# Patient Record
Sex: MALE | Race: WHITE | URBAN - METROPOLITAN AREA
[De-identification: names, ages, dates, MRNs, and addresses within clinical notes are randomized per-mention and may not be internally consistent; named-entity substitution may affect disease eponyms.]

---

## 2019-06-30 ENCOUNTER — APPOINTMENT (OUTPATIENT)
Dept: GENERAL RADIOLOGY | Facility: CLINIC | Age: 39
End: 2019-06-30
Attending: FAMILY MEDICINE
Payer: COMMERCIAL

## 2019-06-30 ENCOUNTER — HOSPITAL ENCOUNTER (EMERGENCY)
Facility: CLINIC | Age: 39
Discharge: HOME | End: 2019-06-30
Attending: FAMILY MEDICINE | Admitting: FAMILY MEDICINE
Payer: COMMERCIAL

## 2019-06-30 VITALS
RESPIRATION RATE: 20 BRPM | SYSTOLIC BLOOD PRESSURE: 119 MMHG | TEMPERATURE: 98 F | OXYGEN SATURATION: 99 % | DIASTOLIC BLOOD PRESSURE: 81 MMHG

## 2019-06-30 DIAGNOSIS — T14.8XXA LOCAL INFECTION OF WOUND: ICD-10-CM

## 2019-06-30 DIAGNOSIS — L08.9 LOCAL INFECTION OF WOUND: ICD-10-CM

## 2019-06-30 DIAGNOSIS — L03.115 CELLULITIS OF RIGHT LOWER EXTREMITY: ICD-10-CM

## 2019-06-30 LAB
ANION GAP SERPL CALCULATED.3IONS-SCNC: 8 MMOL/L (ref 3–14)
BASOPHILS # BLD AUTO: 0.1 10E9/L (ref 0–0.2)
BASOPHILS NFR BLD AUTO: 0.2 %
BUN SERPL-MCNC: 18 MG/DL (ref 7–30)
CALCIUM SERPL-MCNC: 8.6 MG/DL (ref 8.5–10.1)
CHLORIDE SERPL-SCNC: 102 MMOL/L (ref 94–109)
CO2 SERPL-SCNC: 26 MMOL/L (ref 20–32)
CREAT SERPL-MCNC: 1.04 MG/DL (ref 0.66–1.25)
CRP SERPL-MCNC: 61.7 MG/L (ref 0–8)
DIFFERENTIAL METHOD BLD: ABNORMAL
EOSINOPHIL # BLD AUTO: 0.1 10E9/L (ref 0–0.7)
EOSINOPHIL NFR BLD AUTO: 0.3 %
ERYTHROCYTE [DISTWIDTH] IN BLOOD BY AUTOMATED COUNT: 12.3 % (ref 10–15)
GFR SERPL CREATININE-BSD FRML MDRD: >90 ML/MIN/{1.73_M2}
GLUCOSE SERPL-MCNC: 93 MG/DL (ref 70–99)
HCT VFR BLD AUTO: 46.4 % (ref 40–53)
HGB BLD-MCNC: 15.7 G/DL (ref 13.3–17.7)
IMM GRANULOCYTES # BLD: 0.1 10E9/L (ref 0–0.4)
IMM GRANULOCYTES NFR BLD: 0.4 %
LYMPHOCYTES # BLD AUTO: 2.1 10E9/L (ref 0.8–5.3)
LYMPHOCYTES NFR BLD AUTO: 10.5 %
MCH RBC QN AUTO: 29.9 PG (ref 26.5–33)
MCHC RBC AUTO-ENTMCNC: 33.8 G/DL (ref 31.5–36.5)
MCV RBC AUTO: 88 FL (ref 78–100)
MONOCYTES # BLD AUTO: 2.3 10E9/L (ref 0–1.3)
MONOCYTES NFR BLD AUTO: 11.5 %
NEUTROPHILS # BLD AUTO: 15.6 10E9/L (ref 1.6–8.3)
NEUTROPHILS NFR BLD AUTO: 77.1 %
NRBC # BLD AUTO: 0 10*3/UL
NRBC BLD AUTO-RTO: 0 /100
PLATELET # BLD AUTO: 218 10E9/L (ref 150–450)
POTASSIUM SERPL-SCNC: 3.4 MMOL/L (ref 3.4–5.3)
RBC # BLD AUTO: 5.25 10E12/L (ref 4.4–5.9)
SODIUM SERPL-SCNC: 136 MMOL/L (ref 133–144)
WBC # BLD AUTO: 20.3 10E9/L (ref 4–11)

## 2019-06-30 PROCEDURE — 80048 BASIC METABOLIC PNL TOTAL CA: CPT | Performed by: FAMILY MEDICINE

## 2019-06-30 PROCEDURE — 99285 EMERGENCY DEPT VISIT HI MDM: CPT | Mod: 25 | Performed by: FAMILY MEDICINE

## 2019-06-30 PROCEDURE — 25800030 ZZH RX IP 258 OP 636: Performed by: FAMILY MEDICINE

## 2019-06-30 PROCEDURE — 73590 X-RAY EXAM OF LOWER LEG: CPT | Mod: RT

## 2019-06-30 PROCEDURE — 96361 HYDRATE IV INFUSION ADD-ON: CPT | Performed by: FAMILY MEDICINE

## 2019-06-30 PROCEDURE — 73630 X-RAY EXAM OF FOOT: CPT | Mod: RT

## 2019-06-30 PROCEDURE — 96365 THER/PROPH/DIAG IV INF INIT: CPT | Performed by: FAMILY MEDICINE

## 2019-06-30 PROCEDURE — 99285 EMERGENCY DEPT VISIT HI MDM: CPT | Mod: Z6 | Performed by: FAMILY MEDICINE

## 2019-06-30 PROCEDURE — 86140 C-REACTIVE PROTEIN: CPT | Performed by: FAMILY MEDICINE

## 2019-06-30 PROCEDURE — 85025 COMPLETE CBC W/AUTO DIFF WBC: CPT | Performed by: FAMILY MEDICINE

## 2019-06-30 PROCEDURE — 96375 TX/PRO/DX INJ NEW DRUG ADDON: CPT | Performed by: FAMILY MEDICINE

## 2019-06-30 PROCEDURE — 25000128 H RX IP 250 OP 636: Performed by: FAMILY MEDICINE

## 2019-06-30 RX ORDER — IBUPROFEN 200 MG
400 TABLET ORAL EVERY 6 HOURS PRN
COMMUNITY

## 2019-06-30 RX ORDER — ONDANSETRON 2 MG/ML
4 INJECTION INTRAMUSCULAR; INTRAVENOUS ONCE
Status: COMPLETED | OUTPATIENT
Start: 2019-06-30 | End: 2019-06-30

## 2019-06-30 RX ORDER — OXYCODONE HYDROCHLORIDE 5 MG/1
5 TABLET ORAL EVERY 6 HOURS PRN
Qty: 12 TABLET | Refills: 0 | Status: SHIPPED | OUTPATIENT
Start: 2019-06-30

## 2019-06-30 RX ORDER — AMPICILLIN AND SULBACTAM 2; 1 G/1; G/1
3 INJECTION, POWDER, FOR SOLUTION INTRAMUSCULAR; INTRAVENOUS ONCE
Status: COMPLETED | OUTPATIENT
Start: 2019-06-30 | End: 2019-06-30

## 2019-06-30 RX ADMIN — SODIUM CHLORIDE, POTASSIUM CHLORIDE, SODIUM LACTATE AND CALCIUM CHLORIDE 1000 ML: 600; 310; 30; 20 INJECTION, SOLUTION INTRAVENOUS at 16:28

## 2019-06-30 RX ADMIN — HYDROMORPHONE HYDROCHLORIDE 1 MG: 1 INJECTION, SOLUTION INTRAMUSCULAR; INTRAVENOUS; SUBCUTANEOUS at 16:29

## 2019-06-30 RX ADMIN — ONDANSETRON 4 MG: 2 INJECTION INTRAMUSCULAR; INTRAVENOUS at 16:27

## 2019-06-30 RX ADMIN — AMPICILLIN SODIUM AND SULBACTAM SODIUM 3 G: 2; 1 INJECTION, POWDER, FOR SOLUTION INTRAMUSCULAR; INTRAVENOUS at 17:05

## 2019-06-30 NOTE — ED PROVIDER NOTES
"  History     Chief Complaint   Patient presents with     Knee Pain     fell onto right knee 3 days ago redness and swelling in right calft area and \"spreading \"  unable to bear wt has open sore on right knee area     HPI  Benjamin Hyatt is a 38 year old male, past medical history is unremarkable, history obtained from the patient, apparently fell landing on some sharp iron that was lying on the grass 2 or 3 days ago injuring an area just below his right knee.  Presents now with concerns of increasing redness and pain and weeping purulent type material by description from the injury near the tibial tuberosity by description.  Denies fever chills or sweats.  He has taken ibuprofen with minimal improvement in his pain.  He feels that his tetanus is up-to-date.  No records are found in the [place] immunization information connection.  The patient has been able to weight-bear and has been ambulatory over the last couple of days.  He states that he washed the area with water and Shraddha dish detergent and put some antibiotic ointment on it.      Allergies:  No Known Allergies    Problem List:    There are no active problems to display for this patient.       Past Medical History:    No past medical history on file.    Past Surgical History:    No past surgical history on file.    Family History:    No family history on file.    Social History:  Marital Status:   [2]  Social History     Tobacco Use     Smoking status: Not on file   Substance Use Topics     Alcohol use: Not on file     Drug use: Not on file        Medications:      amoxicillin-clavulanate (AUGMENTIN) 875-125 MG tablet   ibuprofen (ADVIL/MOTRIN) 200 MG tablet   oxyCODONE (ROXICODONE) 5 MG tablet         Review of Systems   All other systems reviewed and are negative.      Physical Exam   BP: 119/81  Heart Rate: 120  Temp: 98  F (36.7  C)  Resp: 20  SpO2: 100 %      Physical Exam   Constitutional: He appears well-developed and well-nourished. "   Alert anxious, appears in pain.  Requesting pain medication   HENT:   Head: Normocephalic and atraumatic.   Eyes: Pupils are equal, round, and reactive to light. EOM are normal.   Neck: Normal range of motion. Neck supple.   Cardiovascular: Normal rate, regular rhythm, normal heart sounds and intact distal pulses.   Pulmonary/Chest: Effort normal and breath sounds normal.   Abdominal: Soft. Bowel sounds are normal.   Musculoskeletal:        Legs:  Neurological: He is alert.   Skin: Skin is warm. Capillary refill takes less than 2 seconds.   Psychiatric: He has a normal mood and affect. His behavior is normal.   Nursing note and vitals reviewed.      ED Course        Procedures               Critical Care time:  none               Results for orders placed or performed during the hospital encounter of 06/30/19 (from the past 24 hour(s))   CBC with platelets, differential   Result Value Ref Range    WBC 20.3 (H) 4.0 - 11.0 10e9/L    RBC Count 5.25 4.4 - 5.9 10e12/L    Hemoglobin 15.7 13.3 - 17.7 g/dL    Hematocrit 46.4 40.0 - 53.0 %    MCV 88 78 - 100 fl    MCH 29.9 26.5 - 33.0 pg    MCHC 33.8 31.5 - 36.5 g/dL    RDW 12.3 10.0 - 15.0 %    Platelet Count 218 150 - 450 10e9/L    Diff Method Automated Method     % Neutrophils 77.1 %    % Lymphocytes 10.5 %    % Monocytes 11.5 %    % Eosinophils 0.3 %    % Basophils 0.2 %    % Immature Granulocytes 0.4 %    Nucleated RBCs 0 0 /100    Absolute Neutrophil 15.6 (H) 1.6 - 8.3 10e9/L    Absolute Lymphocytes 2.1 0.8 - 5.3 10e9/L    Absolute Monocytes 2.3 (H) 0.0 - 1.3 10e9/L    Absolute Eosinophils 0.1 0.0 - 0.7 10e9/L    Absolute Basophils 0.1 0.0 - 0.2 10e9/L    Abs Immature Granulocytes 0.1 0 - 0.4 10e9/L    Absolute Nucleated RBC 0.0    CRP inflammation   Result Value Ref Range    CRP Inflammation 61.7 (H) 0.0 - 8.0 mg/L   Basic metabolic panel   Result Value Ref Range    Sodium 136 133 - 144 mmol/L    Potassium 3.4 3.4 - 5.3 mmol/L    Chloride 102 94 - 109 mmol/L     Carbon Dioxide 26 20 - 32 mmol/L    Anion Gap 8 3 - 14 mmol/L    Glucose 93 70 - 99 mg/dL    Urea Nitrogen 18 7 - 30 mg/dL    Creatinine 1.04 0.66 - 1.25 mg/dL    GFR Estimate >90 >60 mL/min/[1.73_m2]    GFR Estimate If Black >90 >60 mL/min/[1.73_m2]    Calcium 8.6 8.5 - 10.1 mg/dL   XR Tibia & Fibula Right 2 Views    Narrative    RIGHT TIBIA-FIBULA TWO VIEWS 6/30/2019 5:00 PM     HISTORY: Trauma near tib tuberosity, infected wound.    COMPARISON: None.      Impression    IMPRESSION: Normal.    JOEY AMADOR MD   XR Foot Right G/E 3 Views    Narrative    FOOT RIGHT THREE OR MORE VIEWS   6/30/2019 5:01 PM     HISTORY: Pain after fall.    COMPARISON: None.      Impression    IMPRESSION: Normal.    JOEY AMADOR MD       Medications   HYDROmorphone (DILAUDID) injection 1 mg (1 mg Intravenous Given 6/30/19 1629)   ondansetron (ZOFRAN) injection 4 mg (4 mg Intravenous Given 6/30/19 1627)   lactated ringers BOLUS 1,000 mL (0 mLs Intravenous Stopped 6/30/19 1825)   ampicillin-sulbactam (UNASYN) 3 g vial to attach to  mL bag (0 g Intravenous Stopped 6/30/19 1824)     5:27 PM  Lab diagnostics and imaging reviewed with the patient; no evidence of fracture but significantly elevated white cell count and CRP with initial presentation with tachycardia at baseline although no hypotension or fever.  He had slight improvement in his pain with 1 mg of Dilaudid IV.  I would recommend that he have a brief hospital admission for pain control and IV antibiotics until his infected wound is clearly improving.  He is going to talk to his wife and find out what he thinks.   6:14 PM  The patient has decided after discussing with his wife that he would like to go home.  I reinforced that this is not the recommended course of action, I stated frankly that he should be admitted to hospital and why.    Assessments & Plan (with Medical Decision Making)   38-year-old male past medical history reviewed as above who presents with concerns of  pain redness warmth and possible infection involving a traumatized area to the right upper shin area as described in the HPI and documented on physical exam.  The patient has evidence of cellulitis with a significant area of warmth and erythema around the abraded area skin defect.  He has elevated white cell count and CRP.  He was tachycardic at presentation although not febrile.  X-rays were negative for acute bony radiographic abnormality.  The patient was counseled for admission to hospital for IV antibiotics and pain control and refused.  He would like to go home and try oral antibiotics, oral pain medication.  I do not think this is a good idea and try to talk him into being admitted for his own safety and for the greatest likelihood of improvement.  He declined.  I believe that he is able to make his own decision in this regard, he is aware of the potential morbidity and mortality and will return to the emergency department if worse or changes.  He is dispositioned home at his request on Augmentin for an antibiotic and on oxycodone in addition to the recommended Tylenol/ibuprofen for pain.  All questions answered disposition to home.    Disclaimer: This note consists of symbols derived from keyboarding, dictation and/or voice recognition software. As a result, there may be errors in the script that have gone undetected. Please consider this when interpreting information found in this chart.      I have reviewed the nursing notes.    I have reviewed the findings, diagnosis, plan and need for follow up with the patient.          Medication List      Started    amoxicillin-clavulanate 875-125 MG tablet  Commonly known as:  AUGMENTIN  1 tablet, Oral, 2 TIMES DAILY     oxyCODONE 5 MG tablet  Commonly known as:  ROXICODONE  5 mg, Oral, EVERY 6 HOURS PRN            Final diagnoses:   Local infection of wound   Cellulitis of right lower extremity       6/30/2019   [facility] EMERGENCY DEPARTMENT     Chi Almodovar  MD [place]  06/30/19 [id number]

## 2019-06-30 NOTE — ED PROVIDER NOTES
"  History     Chief Complaint   Patient presents with     Knee Pain     fell onto right knee 3 days ago redness and swelling in right calft area and \"spreading \"  unable to bear wt has open sore on right knee area     HPI  Benjamin Hyatt is a 38 year old male who     Allergies:  No Known Allergies    Problem List:    There are no active problems to display for this patient.       Past Medical History:    No past medical history on file.    Past Surgical History:    No past surgical history on file.    Family History:    No family history on file.    Social History:  Marital Status:   [2]  Social History     Tobacco Use     Smoking status: Not on file   Substance Use Topics     Alcohol use: Not on file     Drug use: Not on file        Medications:      No current outpatient medications on file.      Review of Systems    Physical Exam   BP: 119/81  Heart Rate: 120  Temp: 98  F (36.7  C)  Resp: 20  SpO2: 100 %      Physical Exam    ED Course        Procedures               Critical Care time:  none               No results found for this or any previous visit (from the past 24 hour(s)).    Medications - No data to display    Assessments & Plan (with Medical Decision Making)     I have reviewed the nursing notes.    I have reviewed the findings, diagnosis, plan and need for follow up with the patient.               Medication List      There are no discharge medications for this visit.         Final diagnoses:   None       6/30/2019   [facility] EMERGENCY DEPARTMENT     Chi Almodovar MD  06/30/19 7674    "

## 2019-06-30 NOTE — DISCHARGE INSTRUCTIONS
You have chosen to go home with oral antibiotics and oral pain medications.  Admission to hospital for IV antibiotics and IV pain control was recommended.  Oxycodone for pain in addition to Tylenol or ibuprofen.  Augmentin as directed x10 days.  If you are not improving or worse or unable to tolerate pain please return to the emergency department.  Wound check in 48 hours is recommended if you choose not to be admitted to hospital.

## 2019-06-30 NOTE — ED AVS SNAPSHOT
[facility] Emergency Department  [address] [facility] [address]  Phone:  [phone]  Fax:  [phone]                                    Benjamin Hyatt   MRN: [id number]    Department:  [facility] Emergency Department   Date of Visit:  6/30/2019           After Visit Summary Signature Page    I have received my discharge instructions, and my questions have been answered. I have discussed any challenges I see with this plan with the nurse or doctor.    ..........................................................................................................................................  Patient/Patient Representative Signature      ..........................................................................................................................................  Patient Representative Print Name and Relationship to Patient    ..................................................               ................................................  Date                                   Time    ..........................................................................................................................................  Reviewed by Signature/Title    ...................................................              ..............................................  Date                                               Time          22EPIC Rev 08/18